# Patient Record
Sex: MALE | Employment: UNEMPLOYED | URBAN - METROPOLITAN AREA
[De-identification: names, ages, dates, MRNs, and addresses within clinical notes are randomized per-mention and may not be internally consistent; named-entity substitution may affect disease eponyms.]

---

## 2018-06-22 ENCOUNTER — LAB VISIT (OUTPATIENT)
Dept: LAB | Facility: HOSPITAL | Age: 73
End: 2018-06-22
Attending: FAMILY MEDICINE
Payer: MEDICARE

## 2018-06-22 DIAGNOSIS — R53.83 FATIGUE: Primary | ICD-10-CM

## 2018-06-22 DIAGNOSIS — I63.59: ICD-10-CM

## 2018-06-22 LAB
INR PPP: 1.6
PROTHROMBIN TIME: 17 SEC

## 2018-06-22 PROCEDURE — 36415 COLL VENOUS BLD VENIPUNCTURE: CPT

## 2018-06-22 PROCEDURE — 85610 PROTHROMBIN TIME: CPT

## 2018-06-25 ENCOUNTER — LAB VISIT (OUTPATIENT)
Dept: LAB | Facility: HOSPITAL | Age: 73
End: 2018-06-25
Attending: INTERNAL MEDICINE
Payer: MEDICARE

## 2018-06-25 DIAGNOSIS — I63.59: Primary | ICD-10-CM

## 2018-06-25 DIAGNOSIS — R53.83 FATIGUE: ICD-10-CM

## 2018-06-25 LAB
INR PPP: 1.2
PROTHROMBIN TIME: 12.2 SEC

## 2018-06-25 PROCEDURE — 85610 PROTHROMBIN TIME: CPT

## 2018-06-25 PROCEDURE — 36415 COLL VENOUS BLD VENIPUNCTURE: CPT

## 2019-01-31 PROCEDURE — 96374 THER/PROPH/DIAG INJ IV PUSH: CPT

## 2019-01-31 PROCEDURE — 96376 TX/PRO/DX INJ SAME DRUG ADON: CPT

## 2019-01-31 PROCEDURE — 99285 EMERGENCY DEPT VISIT HI MDM: CPT | Mod: 25

## 2019-02-01 ENCOUNTER — HOSPITAL ENCOUNTER (OUTPATIENT)
Facility: HOSPITAL | Age: 74
Discharge: HOME | End: 2019-02-01
Attending: EMERGENCY MEDICINE | Admitting: HOSPITALIST
Payer: MEDICARE

## 2019-02-01 VITALS
DIASTOLIC BLOOD PRESSURE: 73 MMHG | TEMPERATURE: 98 F | OXYGEN SATURATION: 97 % | SYSTOLIC BLOOD PRESSURE: 128 MMHG | WEIGHT: 140.5 LBS | RESPIRATION RATE: 20 BRPM | HEIGHT: 70 IN | BODY MASS INDEX: 20.11 KG/M2 | HEART RATE: 70 BPM

## 2019-02-01 DIAGNOSIS — R07.9 CHEST PAIN: ICD-10-CM

## 2019-02-01 DIAGNOSIS — R79.1 SUPRATHERAPEUTIC INR: ICD-10-CM

## 2019-02-01 DIAGNOSIS — R79.89 ELEVATED TROPONIN: ICD-10-CM

## 2019-02-01 DIAGNOSIS — I50.9 ACUTE ON CHRONIC CONGESTIVE HEART FAILURE, UNSPECIFIED HEART FAILURE TYPE: Primary | ICD-10-CM

## 2019-02-01 DIAGNOSIS — I50.9 HEART FAILURE: ICD-10-CM

## 2019-02-01 DIAGNOSIS — I50.43 ACUTE ON CHRONIC COMBINED SYSTOLIC AND DIASTOLIC CONGESTIVE HEART FAILURE: ICD-10-CM

## 2019-02-01 DIAGNOSIS — I48.91 ATRIAL FIBRILLATION, UNSPECIFIED TYPE: ICD-10-CM

## 2019-02-01 DIAGNOSIS — I25.10 CAD (CORONARY ARTERY DISEASE): ICD-10-CM

## 2019-02-01 DIAGNOSIS — I25.10 CORONARY ARTERY DISEASE INVOLVING NATIVE CORONARY ARTERY OF NATIVE HEART WITHOUT ANGINA PECTORIS: ICD-10-CM

## 2019-02-01 DIAGNOSIS — E87.5 HYPERKALEMIA: ICD-10-CM

## 2019-02-01 LAB
ALBUMIN SERPL BCP-MCNC: 3.3 G/DL
ALP SERPL-CCNC: 99 U/L
ALT SERPL W/O P-5'-P-CCNC: 27 U/L
ANION GAP SERPL CALC-SCNC: 9 MMOL/L
AORTIC ROOT ANNULUS: 3.09 CM
AORTIC VALVE CUSP SEPERATION: 2.05 CM
APTT BLDCRRT: 51.7 SEC
ASCENDING AORTA: 2.82 CM
AST SERPL-CCNC: 47 U/L
AV INDEX (PROSTH): 0.7
AV MEAN GRADIENT: 4.26 MMHG
AV PEAK GRADIENT: 7.4 MMHG
AV VALVE AREA: 2.85 CM2
AV VELOCITY RATIO: 0.73
BASOPHILS # BLD AUTO: 0.02 K/UL
BASOPHILS NFR BLD: 0.2 %
BILIRUB SERPL-MCNC: 1.3 MG/DL
BNP SERPL-MCNC: 2847 PG/ML
BSA FOR ECHO PROCEDURE: 1.77 M2
BUN SERPL-MCNC: 21 MG/DL
CALCIUM SERPL-MCNC: 8.9 MG/DL
CHLORIDE SERPL-SCNC: 109 MMOL/L
CHOLEST SERPL-MCNC: 141 MG/DL
CHOLEST/HDLC SERPL: 3.7 {RATIO}
CO2 SERPL-SCNC: 21 MMOL/L
CREAT SERPL-MCNC: 1.4 MG/DL
CV ECHO LV RWT: 0.49 CM
CV STRESS BASE HR: 69 BPM
DIASTOLIC BLOOD PRESSURE: 73 MMHG
DIFFERENTIAL METHOD: ABNORMAL
DOP CALC AO PEAK VEL: 1.36 M/S
DOP CALC AO VTI: 25.71 CM
DOP CALC LVOT AREA: 4.05 CM2
DOP CALC LVOT DIAMETER: 2.27 CM
DOP CALC LVOT PEAK VEL: 0.99 M/S
DOP CALC LVOT STROKE VOLUME: 73.17 CM3
DOP CALCLVOT PEAK VEL VTI: 18.09 CM
E WAVE DECELERATION TIME: 142.1 MSEC
E/A RATIO: 3.16
ECHO LV POSTERIOR WALL: 1.38 CM (ref 0.6–1.1)
EOSINOPHIL # BLD AUTO: 0.4 K/UL
EOSINOPHIL NFR BLD: 4.7 %
ERYTHROCYTE [DISTWIDTH] IN BLOOD BY AUTOMATED COUNT: 17.7 %
EST. GFR  (AFRICAN AMERICAN): 58 ML/MIN/1.73 M^2
EST. GFR  (NON AFRICAN AMERICAN): 50 ML/MIN/1.73 M^2
FRACTIONAL SHORTENING: 19 % (ref 28–44)
GLUCOSE SERPL-MCNC: 123 MG/DL
HCT VFR BLD AUTO: 36.7 %
HDLC SERPL-MCNC: 38 MG/DL
HDLC SERPL: 27 %
HGB BLD-MCNC: 11.9 G/DL
INR PPP: 5.2
INTERVENTRICULAR SEPTUM: 1.2 CM (ref 0.6–1.1)
IVRT: 0.15 MSEC
LA MAJOR: 6.73 CM
LA MINOR: 7.09 CM
LA WIDTH: 5.87 CM
LDLC SERPL CALC-MCNC: 83.4 MG/DL
LEFT ATRIUM SIZE: 4.72 CM
LEFT ATRIUM VOLUME INDEX: 90.5 ML/M2
LEFT ATRIUM VOLUME: 162.62 CM3
LEFT INTERNAL DIMENSION IN SYSTOLE: 4.53 CM (ref 2.1–4)
LEFT VENTRICLE DIASTOLIC VOLUME INDEX: 84.93 ML/M2
LEFT VENTRICLE DIASTOLIC VOLUME: 152.58 ML
LEFT VENTRICLE MASS INDEX: 171.5 G/M2
LEFT VENTRICLE SYSTOLIC VOLUME INDEX: 52.3 ML/M2
LEFT VENTRICLE SYSTOLIC VOLUME: 93.99 ML
LEFT VENTRICULAR INTERNAL DIMENSION IN DIASTOLE: 5.58 CM (ref 3.5–6)
LEFT VENTRICULAR MASS: 308.11 G
LYMPHOCYTES # BLD AUTO: 1.5 K/UL
LYMPHOCYTES NFR BLD: 17.7 %
MCH RBC QN AUTO: 28.1 PG
MCHC RBC AUTO-ENTMCNC: 32.4 G/DL
MCV RBC AUTO: 87 FL
MONOCYTES # BLD AUTO: 1.2 K/UL
MONOCYTES NFR BLD: 14.9 %
MV PEAK A VEL: 0.38 M/S
MV PEAK E VEL: 1.2 M/S
NEUTROPHILS # BLD AUTO: 5.1 K/UL
NEUTROPHILS NFR BLD: 62.5 %
NONHDLC SERPL-MCNC: 103 MG/DL
NUC REST EJECTION FRACTION: 17
OHS CV CPX 85 PERCENT MAX PREDICTED HEART RATE MALE: 126
OHS CV CPX MAX PREDICTED HEART RATE: 148
OHS CV CPX PATIENT IS FEMALE: 0
OHS CV CPX PATIENT IS MALE: 1
OHS CV CPX PEAK DIASTOLIC BLOOD PRESSURE: 63 MMHG
OHS CV CPX PEAK HEAR RATE: 71 BPM
OHS CV CPX PEAK RATE PRESSURE PRODUCT: 9727
OHS CV CPX PEAK SYSTOLIC BLOOD PRESSURE: 137 MMHG
OHS CV CPX PERCENT MAX PREDICTED HEART RATE ACHIEVED: 48
OHS CV CPX RATE PRESSURE PRODUCT PRESENTING: NORMAL
PISA TR MAX VEL: 2.9 M/S
PLATELET # BLD AUTO: 205 K/UL
PMV BLD AUTO: 9.6 FL
POTASSIUM SERPL-SCNC: 5.6 MMOL/L
PROT SERPL-MCNC: 6.3 G/DL
PROTHROMBIN TIME: 54.3 SEC
PULM VEIN S/D RATIO: 1.94
PV PEAK D VEL: 0.36 M/S
PV PEAK S VEL: 0.7 M/S
PV PEAK VELOCITY: 0.96 CM/S
RA MAJOR: 6.76 CM
RA PRESSURE: 3 MMHG
RA WIDTH: 5 CM
RBC # BLD AUTO: 4.24 M/UL
RIGHT VENTRICULAR END-DIASTOLIC DIMENSION: 3.22 CM
RV TISSUE DOPPLER FREE WALL SYSTOLIC VELOCITY 1 (APICAL 4 CHAMBER VIEW): 11.32 M/S
SINUS: 3.12 CM
SODIUM SERPL-SCNC: 139 MMOL/L
STJ: 2.56 CM
SYSTOLIC BLOOD PRESSURE: 146 MMHG
TR MAX PG: 33.64 MMHG
TRICUSPID ANNULAR PLANE SYSTOLIC EXCURSION: 1.17 CM
TRIGL SERPL-MCNC: 98 MG/DL
TROPONIN I SERPL DL<=0.01 NG/ML-MCNC: 0.12 NG/ML
TROPONIN I SERPL DL<=0.01 NG/ML-MCNC: 0.13 NG/ML
TSH SERPL DL<=0.005 MIU/L-ACNC: 1.66 UIU/ML
TV REST PULMONARY ARTERY PRESSURE: 37 MMHG
WBC # BLD AUTO: 8.23 K/UL

## 2019-02-01 PROCEDURE — 80053 COMPREHEN METABOLIC PANEL: CPT

## 2019-02-01 PROCEDURE — 83880 ASSAY OF NATRIURETIC PEPTIDE: CPT

## 2019-02-01 PROCEDURE — 85610 PROTHROMBIN TIME: CPT

## 2019-02-01 PROCEDURE — 80061 LIPID PANEL: CPT

## 2019-02-01 PROCEDURE — 99220 PR INITIAL OBSERVATION CARE,LEVL III: ICD-10-PCS | Mod: ,,, | Performed by: INTERNAL MEDICINE

## 2019-02-01 PROCEDURE — 63600175 PHARM REV CODE 636 W HCPCS: Performed by: NURSE PRACTITIONER

## 2019-02-01 PROCEDURE — 36415 COLL VENOUS BLD VENIPUNCTURE: CPT

## 2019-02-01 PROCEDURE — 84484 ASSAY OF TROPONIN QUANT: CPT | Mod: 91

## 2019-02-01 PROCEDURE — 96376 TX/PRO/DX INJ SAME DRUG ADON: CPT | Mod: 59

## 2019-02-01 PROCEDURE — 96375 TX/PRO/DX INJ NEW DRUG ADDON: CPT | Mod: 59

## 2019-02-01 PROCEDURE — 96372 THER/PROPH/DIAG INJ SC/IM: CPT | Mod: 59

## 2019-02-01 PROCEDURE — 93010 EKG 12-LEAD: ICD-10-PCS | Mod: ,,, | Performed by: INTERNAL MEDICINE

## 2019-02-01 PROCEDURE — 63600175 PHARM REV CODE 636 W HCPCS: Performed by: EMERGENCY MEDICINE

## 2019-02-01 PROCEDURE — G0378 HOSPITAL OBSERVATION PER HR: HCPCS

## 2019-02-01 PROCEDURE — 99220 PR INITIAL OBSERVATION CARE,LEVL III: CPT | Mod: ,,, | Performed by: INTERNAL MEDICINE

## 2019-02-01 PROCEDURE — 93010 ELECTROCARDIOGRAM REPORT: CPT | Mod: ,,, | Performed by: INTERNAL MEDICINE

## 2019-02-01 PROCEDURE — 84443 ASSAY THYROID STIM HORMONE: CPT

## 2019-02-01 PROCEDURE — 25000003 PHARM REV CODE 250: Performed by: EMERGENCY MEDICINE

## 2019-02-01 PROCEDURE — 85730 THROMBOPLASTIN TIME PARTIAL: CPT

## 2019-02-01 PROCEDURE — 94761 N-INVAS EAR/PLS OXIMETRY MLT: CPT

## 2019-02-01 PROCEDURE — 93005 ELECTROCARDIOGRAM TRACING: CPT | Mod: 59

## 2019-02-01 PROCEDURE — 25000003 PHARM REV CODE 250: Performed by: NURSE PRACTITIONER

## 2019-02-01 PROCEDURE — 85025 COMPLETE CBC W/AUTO DIFF WBC: CPT

## 2019-02-01 RX ORDER — FAMOTIDINE 20 MG/1
20 TABLET, FILM COATED ORAL DAILY
Status: DISCONTINUED | OUTPATIENT
Start: 2019-02-01 | End: 2019-02-01 | Stop reason: HOSPADM

## 2019-02-01 RX ORDER — ACETAMINOPHEN 325 MG/1
650 TABLET ORAL EVERY 8 HOURS PRN
Status: DISCONTINUED | OUTPATIENT
Start: 2019-02-01 | End: 2019-02-01 | Stop reason: HOSPADM

## 2019-02-01 RX ORDER — ENOXAPARIN SODIUM 100 MG/ML
40 INJECTION SUBCUTANEOUS EVERY 24 HOURS
Status: DISCONTINUED | OUTPATIENT
Start: 2019-02-01 | End: 2019-02-01 | Stop reason: HOSPADM

## 2019-02-01 RX ORDER — IBUPROFEN 400 MG/1
800 TABLET ORAL EVERY 8 HOURS PRN
Status: DISCONTINUED | OUTPATIENT
Start: 2019-02-01 | End: 2019-02-01 | Stop reason: HOSPADM

## 2019-02-01 RX ORDER — REGADENOSON 0.08 MG/ML
INJECTION, SOLUTION INTRAVENOUS
Status: COMPLETED
Start: 2019-02-01 | End: 2019-02-01

## 2019-02-01 RX ORDER — WARFARIN 4 MG/1
4 TABLET ORAL DAILY
Qty: 30 TABLET | Refills: 0 | Status: SHIPPED | OUTPATIENT
Start: 2019-02-01

## 2019-02-01 RX ORDER — SODIUM CHLORIDE 0.9 % (FLUSH) 0.9 %
5 SYRINGE (ML) INJECTION
Status: DISCONTINUED | OUTPATIENT
Start: 2019-02-01 | End: 2019-02-01 | Stop reason: HOSPADM

## 2019-02-01 RX ORDER — HYDRALAZINE HYDROCHLORIDE 25 MG/1
50 TABLET, FILM COATED ORAL 2 TIMES DAILY
Status: DISCONTINUED | OUTPATIENT
Start: 2019-02-01 | End: 2019-02-01 | Stop reason: HOSPADM

## 2019-02-01 RX ORDER — FUROSEMIDE 10 MG/ML
40 INJECTION INTRAMUSCULAR; INTRAVENOUS DAILY
Status: DISCONTINUED | OUTPATIENT
Start: 2019-02-01 | End: 2019-02-01 | Stop reason: HOSPADM

## 2019-02-01 RX ORDER — ALPRAZOLAM 0.25 MG/1
0.25 TABLET ORAL
Status: DISCONTINUED | OUTPATIENT
Start: 2019-02-01 | End: 2019-02-01 | Stop reason: HOSPADM

## 2019-02-01 RX ORDER — FAMOTIDINE 20 MG/1
20 TABLET, FILM COATED ORAL 2 TIMES DAILY
Status: DISCONTINUED | OUTPATIENT
Start: 2019-02-01 | End: 2019-02-01

## 2019-02-01 RX ORDER — ASPIRIN 325 MG
325 TABLET, DELAYED RELEASE (ENTERIC COATED) ORAL
Status: COMPLETED | OUTPATIENT
Start: 2019-02-01 | End: 2019-02-01

## 2019-02-01 RX ORDER — AMIODARONE HYDROCHLORIDE 200 MG/1
200 TABLET ORAL 2 TIMES DAILY
Status: DISCONTINUED | OUTPATIENT
Start: 2019-02-01 | End: 2019-02-01 | Stop reason: HOSPADM

## 2019-02-01 RX ORDER — HYDROCODONE BITARTRATE AND ACETAMINOPHEN 5; 325 MG/1; MG/1
1 TABLET ORAL EVERY 6 HOURS PRN
Status: DISCONTINUED | OUTPATIENT
Start: 2019-02-01 | End: 2019-02-01 | Stop reason: HOSPADM

## 2019-02-01 RX ORDER — FUROSEMIDE 10 MG/ML
40 INJECTION INTRAMUSCULAR; INTRAVENOUS
Status: COMPLETED | OUTPATIENT
Start: 2019-02-01 | End: 2019-02-01

## 2019-02-01 RX ORDER — FUROSEMIDE 10 MG/ML
40 INJECTION INTRAMUSCULAR; INTRAVENOUS 2 TIMES DAILY
Status: DISCONTINUED | OUTPATIENT
Start: 2019-02-01 | End: 2019-02-01

## 2019-02-01 RX ADMIN — IBUPROFEN 800 MG: 400 TABLET, FILM COATED ORAL at 05:02

## 2019-02-01 RX ADMIN — PROMETHAZINE HYDROCHLORIDE 6.25 MG: 25 INJECTION INTRAMUSCULAR; INTRAVENOUS at 09:02

## 2019-02-01 RX ADMIN — ALPRAZOLAM 0.25 MG: 0.25 TABLET ORAL at 09:02

## 2019-02-01 RX ADMIN — HYDRALAZINE HYDROCHLORIDE 50 MG: 25 TABLET ORAL at 09:02

## 2019-02-01 RX ADMIN — FAMOTIDINE 20 MG: 20 TABLET ORAL at 09:02

## 2019-02-01 RX ADMIN — FUROSEMIDE 40 MG: 10 INJECTION, SOLUTION INTRAVENOUS at 09:02

## 2019-02-01 RX ADMIN — ENOXAPARIN SODIUM 40 MG: 100 INJECTION SUBCUTANEOUS at 05:02

## 2019-02-01 RX ADMIN — ASPIRIN 325 MG: 325 TABLET, DELAYED RELEASE ORAL at 02:02

## 2019-02-01 RX ADMIN — AMIODARONE HYDROCHLORIDE 200 MG: 200 TABLET ORAL at 09:02

## 2019-02-01 RX ADMIN — FUROSEMIDE 40 MG: 10 INJECTION, SOLUTION INTRAVENOUS at 02:02

## 2019-02-01 RX ADMIN — HYDROCODONE BITARTRATE AND ACETAMINOPHEN 1 TABLET: 5; 325 TABLET ORAL at 09:02

## 2019-02-01 RX ADMIN — FUROSEMIDE 40 MG: 10 INJECTION, SOLUTION INTRAVENOUS at 04:02

## 2019-02-01 NOTE — HOSPITAL COURSE
Seen and evaluated by Cardiology who obtained a 2D echo and nuclear stress test.  2D echo showed sore late depressed LVEF function 20% atrial septal and apical akinesis plus left ventricle enlargement and grade 3 diastolic dysfunction.  Cardiology noted that if the nuclear stress test was basically unchanged and correlated with 2D echo and patient's last known function he will be okay to discharge. He has been diuresed with IV Lasix b.i.d. since admission and tells me he feels better.  He has been ambulating about the room and in the hallway without difficulty no evidence of shortness of breath or fluid volume overload.  Nuclear stress test showed  there is a severe, infarct defect(s) in the apical and anteroseptal wall(s),  consistent with known disease. Patient cleared by Cardiology for discharge with close follow-up  in clinic.      Patient noted to have supratherapeutic INR equals 5.2.  He is instructed to hold his Coumadin dose x2 day 02-20-19 and 02/03/2019.  Resume his usual dose on Monday and reports his Coumadin Clinic.  Patient sees cardiology at [facility] he has follow-up appointment with primary care Dr. Edwards arranged by case management.  He is super anxious for discharge, his vitals were grossly normal blood pressure 124/62 pulse 69 respirations 16 sat 96% on room air.  Discharged in stable condition

## 2019-02-01 NOTE — CONSULTS
Consulted for Diet education concerning CHF. Attempted to meet with pt; pt off unit. Provided handouts and f/u resources to bedside. RD will attempt f/u education.

## 2019-02-01 NOTE — ED PROVIDER NOTES
Encounter Date: 2019       History     Chief Complaint   Patient presents with    Chest Pain     for several years, reports reproducable tenderness, denies any sob or other complaints.      71-year-old male with history of CHF, CAD, AFib, hypertension present with chest pain shortness of breath just prior to arrival.  Chest pain is in left chest, nonradiating that has now resolved at ED.   Patient has been having worsening of shortness of breath for the past 1 week.  Shortness of breath worsening with walking for 3-5 steps. Patient was seen by his cardiologist, Dr. Nicole last week, and had his medication changed (unknown) without improvement. Patient has been taking all his medications without abruption. Patient denies fever chills nausea vomiting.            Review of patient's allergies indicates:  No Known Allergies  Past Medical History:   Diagnosis Date    Anticoagulant long-term use     Arthritis     Atrial fibrillation     Atrial flutter     Cardiomyopathy     Carotid artery occlusion     CHF (congestive heart failure)     Coronary artery disease     Hypertension     Stroke      Past Surgical History:   Procedure Laterality Date    CARDIAC SURGERY      EXTRACTION-LEAD N/A 10/28/2015    Performed by Martin Alva [facility] at [facility] CATH LAB    EYE SURGERY      TONSILLECTOMY       History reviewed. No pertinent family history.  Social History     Tobacco Use    Smoking status: Former Smoker     Last attempt to quit: 1997     Years since quittin.3   Substance Use Topics    Alcohol use: No     Alcohol/week: 0.0 oz    Drug use: No     Review of Systems   Constitutional: Negative for fever.   HENT: Negative for sore throat.    Respiratory: Positive for shortness of breath.    Cardiovascular: Positive for chest pain.   Gastrointestinal: Negative for nausea.   Genitourinary: Negative for dysuria.   Musculoskeletal: Negative for back pain.   Skin: Negative for rash.   Neurological:  Negative for weakness.   Hematological: Does not bruise/bleed easily.       Physical Exam     Initial Vitals [02/01/19 0011]   BP Pulse Resp Temp SpO2   126/72 68 18 98 °F (36.7 °C) 100 %      MAP       --         Physical Exam    Constitutional: He appears well-developed and well-nourished. He is not diaphoretic. No distress.   HENT:   Head: Normocephalic and atraumatic.   Eyes: EOM are normal. Pupils are equal, round, and reactive to light.   Neck: Normal range of motion. Neck supple.   Cardiovascular: Normal rate and regular rhythm.   Pulmonary/Chest: No stridor. No respiratory distress. He has no wheezes. He has rales. He exhibits no tenderness.   Abdominal: Soft. Bowel sounds are normal. He exhibits no distension. There is no tenderness. There is no rebound.   Musculoskeletal: Normal range of motion. He exhibits no edema or tenderness.   Neurological: He is alert and oriented to person, place, and time. He has normal strength.   Skin: Skin is warm and dry. No rash noted.   Psychiatric: He has a normal mood and affect. His behavior is normal.         ED Course   Procedures  Labs Reviewed   COMPREHENSIVE METABOLIC PANEL - Abnormal; Notable for the following components:       Result Value    Potassium 5.6 (*)     CO2 21 (*)     Glucose 123 (*)     Albumin 3.3 (*)     Total Bilirubin 1.3 (*)     AST 47 (*)     eGFR if  58 (*)     eGFR if non  50 (*)     All other components within normal limits   B-TYPE NATRIURETIC PEPTIDE - Abnormal; Notable for the following components:    BNP 2,847 (*)     All other components within normal limits   CBC W/ AUTO DIFFERENTIAL - Abnormal; Notable for the following components:    RBC 4.24 (*)     Hemoglobin 11.9 (*)     Hematocrit 36.7 (*)     RDW 17.7 (*)     Mono # 1.2 (*)     Lymph% 17.7 (*)     All other components within normal limits   TROPONIN I - Abnormal; Notable for the following components:    Troponin I 0.133 (*)     All other components  within normal limits   PROTIME-INR - Abnormal; Notable for the following components:    Prothrombin Time 54.3 (*)     INR 5.2 (*)     All other components within normal limits    Narrative:     INR  critical result(s) called and verbal readback obtained from   WILLY TIDWELL, 02/01/2019 02:46   APTT - Abnormal; Notable for the following components:    aPTT 51.7 (*)     All other components within normal limits   TROPONIN I     EKG Readings: (Independently Interpreted)   Initial Reading: No STEMI.   Paced rhythm, no stemi       Imaging Results          X-Ray Chest AP Portable (Final result)  Result time 02/01/19 01:55:24    Final result by Kvng Galdamez MD (02/01/19 01:55:24)                 Impression:      Cardiomegaly with mildly accentuated bilateral interstitial markings which could relate to mild interstitial edema.  Clinical correlation is advised.      Electronically signed by: Kvng Galdamez MD  Date:    02/01/2019  Time:    01:55             Narrative:    EXAMINATION:  XR CHEST AP PORTABLE    CLINICAL HISTORY:  chest pain;    TECHNIQUE:  Single frontal view of the chest was performed.    COMPARISON:  10/28/2015    FINDINGS:  There is a right chest wall cardiac pacing device present.  There are postoperative changes of prior median sternotomy.  The cardiomediastinal silhouette is enlarged.  The lungs are symmetrically expanded with mildly accentuated bilateral interstitial markings.  No evidence of large confluent airspace consolidation or pleural effusion.  No evidence of pneumothorax.  Visualized osseous structures appear intact.                                 Medical Decision Making:   Clinical Tests:   Lab Tests: Ordered and Reviewed  Radiological Study: Ordered and Reviewed  Medical Tests: Ordered and Reviewed  ED Management:  71-year-old male with history CHF with Severe ischemic cardiomyopathy last EF 10-15% September 2018, present with worsening of shortness of breath for the past few weeks.   Patient was seen by his cardiologist Dr. Nicole last week and had his lasix adjusted without significant improvement. His BNP on 1/28 was 615, today is 2847. No significant improvement of SOB after IV lasix given.  Recent INR also elevated at 5.2 without evidence of acute bleeding. Will hold coumadin today.  Elevated troponin @ 0.133.  Will admit for obs.    Other:   I have discussed this case with another health care provider.                      Clinical Impression:   The primary encounter diagnosis was Acute on chronic congestive heart failure, unspecified heart failure type. Diagnoses of Chest pain, Supratherapeutic INR, Elevated troponin, and Hyperkalemia were also pertinent to this visit.                             Gume Nuñez MD  02/01/19 042

## 2019-02-01 NOTE — NURSING
Discharge orders received.  IV discontinued without complaint, catheter intact.  Telemetry monitoring discontinued.    Patient AAO x 4.  Currently denies CP, nausea, or SOB at rest on RA.  Chronic GONZALEZ.  VS stable.   Respirations even, unlabored.  No distress noted.     No falls or harm noted during stay.

## 2019-02-01 NOTE — PROGRESS NOTES
Follow up appointment scheduled with PCP.  Spoke with Tamika.  All information (Date, time, location and contact info) placed in AVS and on DC sheet.  Information given and explained to pt. Pt also instructed to call 24-48 hours prior to scheduled time if rescheduling needed.  Patient instructed to bring all meds currently taking in their original bottles along with insurance card and picture ID to all appointments.

## 2019-02-01 NOTE — H&P
[facility] Medicine  History & Physical    Patient Name: Martin Gutierrez  MRN: [id number]  Admission Date: 2/1/2019  Attending Physician: Ruthie Acosta MD   Primary Care Provider: Haja Edwards MD         Patient information was obtained from patient and ER records.     Subjective:     Principal Problem:Acute on chronic congestive heart failure    Chief Complaint:   Chief Complaint   Patient presents with    Chest Pain     for several years, reports reproducable tenderness, denies any sob or other complaints.         HPI: Martin Gutierrez is a 73 y.o. male patient with a history of long-term anticoagulation use, atrial fib, cardiomyopathy, heart failure, CAD, hypertension, stroke, and panic attacks.  Patient presented for evaluation of acute onset left-sided chest pain with associated shortness of breath, chest pain was nonradiating, however he reports activity intolerance orthopnea where as he could not lay down flat and he could not walk any more than about 4 or 5 steps without getting extremely short of breath.  Patient reports that the [facility] which is his cardiologist and he saw him last week.  Patient reports a recent change in his medication regimen with no improvement.    Initial workup reveals supratherapeutic INR at 5.2, hyperkalemia at 5.6, elevated troponin 1 level peaked at 0.133, and BNP of 2,847. Chest x-ray x-ray revealed cardiomegaly with mild interstitial edema, patient's last 2D echo was in 2015 he had an LVEF of 40% with diastolic dysfunction    Past Medical History:   Diagnosis Date    Anticoagulant long-term use     Anxiety     Arthritis     Atrial fibrillation     Atrial flutter     Cardiomyopathy     Carotid artery occlusion     CHF (congestive heart failure)     Coronary artery disease     Debility     Hypertension     Insomnia     Panic attacks     SOB (shortness of breath) on exertion     Stroke        Past Surgical History:   Procedure  Laterality Date    CARDIAC SURGERY      EXTRACTION-LEAD N/A 10/28/2015    Performed by [facility] MD at [facility] LAB    EYE SURGERY      TONSILLECTOMY         Review of patient's allergies indicates:  No Known Allergies    No current facility-administered medications on file prior to encounter.      Current Outpatient Medications on File Prior to Encounter   Medication Sig    hydrocodone-acetaminophen 5-325mg (NORCO) 5-325 mg per tablet Take 1 tablet by mouth every 6 (six) hours as needed for Pain.    alprazolam (XANAX) 0.25 MG tablet Take 0.25 mg by mouth as needed for Anxiety.    amiodarone (PACERONE) 200 MG Tab Take 200 mg by mouth 2 (two) times daily.    atorvastatin (LIPITOR) 40 MG tablet Take 40 mg by mouth once daily.    doxycycline (VIBRA-TABS) 100 MG tablet Take 1 tablet (100 mg total) by mouth 2 (two) times daily.    fenofibric acid (TRILIPIX) 135 mg CpDR Take by mouth once daily.    ferrous sulfate 325 (65 FE) MG EC tablet Take 1 tablet (325 mg total) by mouth 2 (two) times daily.    folic acid (FOLVITE) 1 MG tablet Take 1 tablet (1 mg total) by mouth once daily.    furosemide (LASIX) 40 MG tablet Take 40 mg by mouth 2 (two) times daily.    hydrALAZINE (APRESOLINE) 50 MG tablet Take 50 mg by mouth 2 (two) times daily.     spironolactone (ALDACTONE) 25 MG tablet Take 25 mg by mouth 3 (three) times a week.     warfarin (COUMADIN) 4 MG tablet Take 4 mg by mouth once daily.     Family History     None        Tobacco Use    Smoking status: Former Smoker     Packs/day: 0.25     Years: 33.00     Pack years: 8.25     Last attempt to quit: 1997     Years since quittin.3    Smokeless tobacco: Never Used   Substance and Sexual Activity    Alcohol use: No     Alcohol/week: 0.0 oz    Drug use: No    Sexual activity: Not on file     Review of Systems   Constitutional: Negative for appetite change, chills, diaphoresis and fever.   HENT: Negative for congestion, hearing loss, sore  throat, tinnitus and trouble swallowing.    Eyes: Negative for photophobia, discharge, itching and visual disturbance.   Respiratory: Positive for shortness of breath. Negative for apnea, cough, wheezing and stridor.    Cardiovascular: Negative for chest pain, palpitations and leg swelling.   Gastrointestinal: Negative for abdominal distention, abdominal pain, blood in stool, constipation, diarrhea and nausea.   Endocrine: Negative for polydipsia, polyphagia and polyuria.   Genitourinary: Negative for difficulty urinating, dysuria, flank pain and frequency.   Musculoskeletal: Negative for arthralgias, joint swelling and neck stiffness.   Skin: Negative for color change, rash and wound.   Neurological: Negative for dizziness, tremors, seizures, light-headedness, numbness and headaches.   Hematological: Negative for adenopathy.   Psychiatric/Behavioral: Negative for hallucinations and self-injury.     Objective:     Vital Signs (Most Recent):  Temp: 97.7 °F (36.5 °C) (02/01/19 0804)  Pulse: 68 (02/01/19 0804)  Resp: 18 (02/01/19 0804)  BP: (!) 152/81 (02/01/19 0804)  SpO2: 98 % (02/01/19 0804) Vital Signs (24h Range):  Temp:  [97.7 °F (36.5 °C)-98 °F (36.7 °C)] 97.7 °F (36.5 °C)  Pulse:  [68-70] 68  Resp:  [14-19] 18  SpO2:  [93 %-100 %] 98 %  BP: (126-152)/(72-88) 152/81     Weight: 63.7 kg (140 lb 8 oz)  Body mass index is 20.16 kg/m².    Physical Exam   Constitutional: He is oriented to person, place, and time. He appears well-developed and well-nourished. He is cooperative.   HENT:   Head: Normocephalic and atraumatic.   Eyes: Conjunctivae and lids are normal. Pupils are equal, round, and reactive to light.   Neck: Normal range of motion and full passive range of motion without pain. Neck supple. No JVD present. No edema present. No thyroid mass present.   Cardiovascular: Normal rate, S1 normal, S2 normal and intact distal pulses.   No murmur heard.  Pulmonary/Chest: Effort normal.   Abdominal: Soft. Bowel sounds  are normal. He exhibits no distension and no abdominal bruit. There is no splenomegaly or hepatomegaly. There is no tenderness. There is no CVA tenderness.   Musculoskeletal: Normal range of motion.   Lymphadenopathy:     He has no cervical adenopathy.     He has no axillary adenopathy.   Neurological: He is alert and oriented to person, place, and time. He has normal reflexes. He displays no tremor. He displays no seizure activity.   Skin: Skin is warm, dry and intact.   Psychiatric: He has a normal mood and affect. His speech is normal. Thought content normal. Cognition and memory are normal.         CRANIAL NERVES     CN III, IV, VI   Pupils are equal, round, and reactive to light.       Significant Labs:   CBC:   Recent Labs   Lab 02/01/19 0143   WBC 8.23   HGB 11.9*   HCT 36.7*        CMP:   Recent Labs   Lab 02/01/19 0143      K 5.6*      CO2 21*   *   BUN 21   CREATININE 1.4   CALCIUM 8.9   PROT 6.3   ALBUMIN 3.3*   BILITOT 1.3*   ALKPHOS 99   AST 47*   ALT 27   ANIONGAP 9   EGFRNONAA 50*     Cardiac Markers:   Recent Labs   Lab 02/01/19 0143   BNP 2,847*     Coagulation:   Recent Labs   Lab 02/01/19 0143   INR 5.2*   APTT 51.7*     Lactic Acid: No results for input(s): LACTATE in the last 48 hours.  Lipase: No results for input(s): LIPASE in the last 48 hours.  Lipid Panel: No results for input(s): CHOL, HDL, LDLCALC, TRIG, CHOLHDL in the last 48 hours.  Magnesium: No results for input(s): MG in the last 48 hours.  Troponin:   Recent Labs   Lab 02/01/19  0143 02/01/19  0407 02/01/19  0807   TROPONINI 0.133* 0.125* 0.116*     Significant Imaging:   Imaging Results          X-Ray Chest AP Portable (Final result)  Result time 02/01/19 01:55:24    Final result by Kvng Galdamez MD (02/01/19 01:55:24)                 Impression:      Cardiomegaly with mildly accentuated bilateral interstitial markings which could relate to mild interstitial edema.  Clinical correlation is  advised.      Electronically signed by: Kvng Galdamez MD  Date:    02/01/2019  Time:    01:55             Narrative:    EXAMINATION:  XR CHEST AP PORTABLE    CLINICAL HISTORY:  chest pain;    TECHNIQUE:  Single frontal view of the chest was performed.    COMPARISON:  10/28/2015    FINDINGS:  There is a right chest wall cardiac pacing device present.  There are postoperative changes of prior median sternotomy.  The cardiomediastinal silhouette is enlarged.  The lungs are symmetrically expanded with mildly accentuated bilateral interstitial markings.  No evidence of large confluent airspace consolidation or pleural effusion.  No evidence of pneumothorax.  Visualized osseous structures appear intact.                                  Assessment/Plan:     * Acute on chronic congestive heart failure    Patient with a history of heart failure with LVEF 40% plus diastolic dysfunction he was started on IV Lasix at the time of presentation, with some improvement in his condition due to his risk factors, and elevated troponins, cardiology will be consulted, no fever and no infectious process noted no white count, creatinine is within normal limits.  Continuous cardiac monitoring  Continue to trend CE Q8 hrs X3 sets  ASA 81 mg PO Daily  Continue diuresing with IV Lasix  Continue home spironolactone 25 mg  TSH and lipid panel for completeness       Atrial fibrillation    Chronic paroxysmal AFib with a flutter on warfarin INR 5.2 will hold today's dosage and monitor, patient denies any overt signs of bleeding including melena hematochezia hemoptysis or hematemesis.  -as below hold Coumadin today as INR 5.2  -continue amiodarone 200 mg daily     Coronary artery disease, PCI to LAD in 1994 and CABG in 1997, PCI LCx 1997    Cardiology following as above  Continue fenofibrate, atorvastatin         VTE Risk Mitigation (From admission, onward)        Ordered     enoxaparin injection 40 mg  Daily      02/01/19 0755          Letitia BERMUDEZ  VERN Bates, FNP-C  Hospitalist - Department of Hospital Medicine  [address] [address]  Office [phone]; Pager [phone]

## 2019-02-01 NOTE — PLAN OF CARE
02/01/19 1133   Discharge Assessment   Assessment Type Discharge Planning Assessment   TN to patient's room to complete DC needs assessment.  Patient off unit.

## 2019-02-01 NOTE — CONSULTS
[facility]  Cardiology  Consult Note    Patient Name: Martin Gutierrez  MRN: [id number]  Admission Date: 2/1/2019  Hospital Length of Stay: 0 days  Code Status: Full Code   Attending Provider: Ruthie Acosta MD   Consulting Provider: Erwin Obando MD  Primary Care Physician: Haja Edwards MD  Principal Problem:Acute on chronic congestive heart failure    Patient information was obtained from patient and ER records.     Consults  Subjective:     Chief Complaint:  CP     HPI:   71-year-old male with history of CHF, CAD, AFib, hypertension present with chest pain shortness of breath just prior to arrival.  Chest pain is in left chest, nonradiating that has now resolved at ED.   Patient has been having worsening of shortness of breath for the past 1 week.  Shortness of breath worsening with walking for 3-5 steps. Patient was seen by his cardiologist, Dr. Nicole last week, and had his medication changed (unknown) without improvement. Patient has been taking all his medications without abruption. Patient denies fever chills nausea vomiting.      Followed by Heart Clinic and [place] [facility]  Troponin 0.1 flat pattern  Currently CP free  EKG V-paced    Last saw EP 2015  paroxysmal AF, chronic systolic ischemic CMP (EF recently 40%) s/p CABG and PCI to LAD/LCx in 1990s, HTN, HLD, and ICD implant in 2006 with inappropriate shocks for AF with RVR presents for lead extraction evaluation.     ICD was at [facility] last year, had generator change to PPM 8/2014.   Patient did not want an ICD replaced as he had PTSD from inappropriate shocks.  PPM placed instead.   Then in May of 2015 developed a superficial blister over pocket.  Underwent removal of generator, leads capped, cultures reportedly negative and he was treated with clindamycin for 14 days.   Originally improved.  However for 3-4 weeks it began to blister again. He was following with ID at [facility], Dr. Sales, and was seeing Dr. Jarrett. He  completed 14 days of clindamycin which did not help. He has since been treating it topically with mupirocin ointment.   After placed on doxycycline which he just finished.  No fever or chills, cultures negative.   He does have pain at the site.     ECHO showed LVEF 40%  CXR showed single-coil ICD lead in the RVOT    Past Medical History:   Diagnosis Date    Anticoagulant long-term use     Anxiety     Arthritis     Atrial fibrillation     Atrial flutter     Cardiomyopathy     Carotid artery occlusion     CHF (congestive heart failure)     Coronary artery disease     Debility     Hypertension     Insomnia     Panic attacks     SOB (shortness of breath) on exertion     Stroke        Past Surgical History:   Procedure Laterality Date    CARDIAC SURGERY      EXTRACTION-LEAD N/A 10/28/2015    Performed by [facility] at [facility] CATH LAB    EYE SURGERY      TONSILLECTOMY         Review of patient's allergies indicates:  No Known Allergies    No current facility-administered medications on file prior to encounter.      Current Outpatient Medications on File Prior to Encounter   Medication Sig    hydrocodone-acetaminophen 5-325mg (NORCO) 5-325 mg per tablet Take 1 tablet by mouth every 6 (six) hours as needed for Pain.    alprazolam (XANAX) 0.25 MG tablet Take 0.25 mg by mouth as needed for Anxiety.    amiodarone (PACERONE) 200 MG Tab Take 200 mg by mouth 2 (two) times daily.    atorvastatin (LIPITOR) 40 MG tablet Take 40 mg by mouth once daily.    doxycycline (VIBRA-TABS) 100 MG tablet Take 1 tablet (100 mg total) by mouth 2 (two) times daily.    fenofibric acid (TRILIPIX) 135 mg CpDR Take by mouth once daily.    ferrous sulfate 325 (65 FE) MG EC tablet Take 1 tablet (325 mg total) by mouth 2 (two) times daily.    folic acid (FOLVITE) 1 MG tablet Take 1 tablet (1 mg total) by mouth once daily.    furosemide (LASIX) 40 MG tablet Take 40 mg by mouth 2 (two) times daily.    hydrALAZINE  (APRESOLINE) 50 MG tablet Take 50 mg by mouth 2 (two) times daily.     spironolactone (ALDACTONE) 25 MG tablet Take 25 mg by mouth 3 (three) times a week.     warfarin (COUMADIN) 4 MG tablet Take 4 mg by mouth once daily.     Family History     None        Tobacco Use    Smoking status: Former Smoker     Packs/day: 0.25     Years: 33.00     Pack years: 8.25     Last attempt to quit: 1997     Years since quittin.3    Smokeless tobacco: Never Used   Substance and Sexual Activity    Alcohol use: No     Alcohol/week: 0.0 oz    Drug use: No    Sexual activity: Not on file     Review of Systems   Constitution: Negative for decreased appetite.   HENT: Negative for ear discharge.    Eyes: Negative for blurred vision.   Endocrine: Negative for polyphagia.   Skin: Negative for nail changes.   Neurological: Negative for aphonia.   Psychiatric/Behavioral: Negative for hallucinations.     Objective:     Vital Signs (Most Recent):  Temp: 97.7 °F (36.5 °C) (19)  Pulse: 68 (19)  Resp: 18 (19)  BP: (!) 152/81 (19)  SpO2: 98 % (19) Vital Signs (24h Range):  Temp:  [97.7 °F (36.5 °C)-98 °F (36.7 °C)] 97.7 °F (36.5 °C)  Pulse:  [68-70] 68  Resp:  [14-19] 18  SpO2:  [93 %-100 %] 98 %  BP: (126-152)/(72-88) 152/81     Weight: 63.7 kg (140 lb 8 oz)  Body mass index is 20.16 kg/m².    SpO2: 98 %  O2 Device (Oxygen Therapy): room air      Intake/Output Summary (Last 24 hours) at 2019 1106  Last data filed at 2019 0614  Gross per 24 hour   Intake --   Output 2650 ml   Net -2650 ml       Lines/Drains/Airways     Drain                 Urethral Catheter 10/28/15 0836 1192 days          Peripheral Intravenous Line                 Peripheral IV - Single Lumen 19 0012 Left Hand less than 1 day                Physical Exam   Constitutional: He is oriented to person, place, and time. He appears well-developed and well-nourished.   HENT:   Head: Normocephalic and  atraumatic.   Eyes: Conjunctivae are normal. Pupils are equal, round, and reactive to light.   Neck: Normal range of motion. Neck supple.   Cardiovascular: Normal rate, normal heart sounds and intact distal pulses.   Pulmonary/Chest: Effort normal and breath sounds normal.   Abdominal: Soft. Bowel sounds are normal.   Musculoskeletal: Normal range of motion.   Neurological: He is alert and oriented to person, place, and time.   Skin: Skin is warm and dry.       Significant Labs: All pertinent lab results from the last 24 hours have been reviewed.    Significant Imaging: Echocardiogram:   2D echo with color flow doppler:   Results for orders placed or performed in visit on 09/21/15   2D Echo w/ Color Flow Doppler   Result Value Ref Range    QEF 40 (A) 55 - 65    Diastolic Dysfunction Yes (A)     Est. PA Systolic Pressure 37     Tricuspid Valve Regurgitation MILD      Assessment and Plan:     * Acute on chronic congestive heart failure    Chronic ischemic CM. Diuresis and afterload reduction as tolerated     S/P abdominal aortic aneurysm repair 1997          Coronary artery disease, PCI to LAD in 1994 and CABG in 1997, PCI LCx 1997    Mildly elevated troponin in a flat pattern. Echo and stress test today - ok for d/c if negative for significant ischemia     Carotid artery disease          Atrial fibrillation    Rate controlled on coumadin         VTE Risk Mitigation (From admission, onward)        Ordered     enoxaparin injection 40 mg  Daily      02/01/19 4884          Thank you for your consult. I will follow-up with patient. Please contact us if you have any additional questions.    Erwin Obando MD  Cardiology   [facility]

## 2019-02-01 NOTE — DISCHARGE SUMMARY
[facility] Hospital Medicine  Discharge Summary      Patient Name: Martin Gutierrez  MRN: [id number]  Admission Date: 2/1/2019  Hospital Length of Stay: 0 days  Discharge Date and Time:  02/01/2019 5:56 PM  Attending Physician: Ruthie Acosta MD   Discharging Provider: MINI Estrada  Primary Care Provider: Haja Edwards MD      HPI:   Martin Gutierrez is a 73 y.o. male patient with a history of long-term anticoagulation use, atrial fib, cardiomyopathy, heart failure, CAD, hypertension, stroke, and panic attacks.  Patient presented for evaluation of acute onset left-sided chest pain with associated shortness of breath, chest pain was nonradiating, however he reports activity intolerance orthopnea where as he could not lay down flat and he could not walk any more than about 4 or 5 steps without getting extremely short of breath.  Patient reports that the costal which is his cardiologist and he saw him last week.  Patient reports a recent change in his medication regimen with no improvement.    Initial workup reveals supratherapeutic INR at 5.2, hyperkalemia at 5.6, elevated troponin 1 level peaked at 0.133, and BNP of 2,847. Chest x-ray x-ray revealed cardiomegaly with mild interstitial edema, patient's last 2D echo was in 2015 he had an LVEF of 40% with diastolic dysfunction    * No surgery found *      Hospital Course:   Seen and evaluated by Cardiology who obtained a 2D echo and nuclear stress test.  2D echo showed sore late depressed LVEF function 20% atrial septal and apical akinesis plus left ventricle enlargement and grade 3 diastolic dysfunction.  Cardiology noted that if the nuclear stress test was basically unchanged and correlated with 2D echo and patient's last known function he will be okay to discharge. He has been diuresed with IV Lasix b.i.d. since admission and tells me he feels better.  He has been ambulating about the room and in the hallway without difficulty no  evidence of shortness of breath or fluid volume overload.  Nuclear stress test showed  there is a severe, infarct defect(s) in the apical and anteroseptal wall(s),  consistent with known disease. Patient cleared by Cardiology for discharge with close follow-up  in clinic.      Patient noted to have supratherapeutic INR equals 5.2.  He is instructed to hold his Coumadin dose x2 day 02-20-19 and 02/03/2019.  Resume his usual dose on Monday and reports his Coumadin Clinic.  Patient sees cardiology at [facility] he has follow-up appointment with primary care Dr. Edwards arranged by case management.  He is super anxious for discharge, his vitals were grossly normal blood pressure 124/62 pulse 69 respirations 16 sat 96% on room air.  Discharged in stable condition     Consults:   Consults (From admission, onward)        Status Ordering Provider     Inpatient consult to Cardiology  Once     Provider:  Phil Roth MD    Acknowledged JEROME WU     Inpatient consult to Registered Dietitian/Nutritionist  Once     Provider:  (Not yet assigned)    Completed JEROME WU     IP consult to case management  Once     Provider:  (Not yet assigned)    Acknowledged JEROME WU          No new Assessment & Plan notes have been filed under this hospital service since the last note was generated.  Service: Hospital Medicine    Final Active Diagnoses:    Diagnosis Date Noted POA    PRINCIPAL PROBLEM:  Acute on chronic congestive heart failure [I50.9] 02/01/2019 Yes    Atrial fibrillation [I48.91] 09/18/2015 Yes    Coronary artery disease, PCI to LAD in 1994 and CABG in 1997, PCI LCx 1997 [I25.10] 09/18/2015 Yes    S/P abdominal aortic aneurysm repair 1997 [Z98.890, Z86.79] 09/18/2015 Not Applicable    Carotid artery disease [I77.9] 09/18/2015 Yes      Problems Resolved During this Admission:       Discharged Condition: stable    Disposition: Home or Self Care    Follow Up:  Follow-up Information      Haja Edwards MD On 2/5/2019.    Specialty:  Family Medicine  Why:  @1:00pm, for Hospital Follow up  Contact information:  [place] [id number] [phone]                 Patient Instructions:      Diet Cardiac     Activity as tolerated       Significant Diagnostic Studies: Labs:   CMP   Recent Labs   Lab 02/01/19  0143      K 5.6*      CO2 21*   *   BUN 21   CREATININE 1.4   CALCIUM 8.9   PROT 6.3   ALBUMIN 3.3*   BILITOT 1.3*   ALKPHOS 99   AST 47*   ALT 27   ANIONGAP 9   ESTGFRAFRICA 58*   EGFRNONAA 50*   , CBC   Recent Labs   Lab 02/01/19  0143   WBC 8.23   HGB 11.9*   HCT 36.7*      , INR   Lab Results   Component Value Date    INR 5.2 (HH) 02/01/2019    INR 1.2 06/25/2018    INR 1.6 (H) 06/22/2018   , Lipid Panel   Lab Results   Component Value Date    CHOL 141 02/01/2019    HDL 38 (L) 02/01/2019    LDLCALC 83.4 02/01/2019    TRIG 98 02/01/2019    CHOLHDL 27.0 02/01/2019   , Troponin   Recent Labs   Lab 02/01/19  1421   TROPONINI 0.122*    and A1C: No results for input(s): HGBA1C in the last 4320 hours.    Pending Diagnostic Studies:     None         Medications:  Reconciled Home Medications:      Medication List      CHANGE how you take these medications    warfarin 4 MG tablet  Commonly known as:  COUMADIN  Take 1 tablet (4 mg total) by mouth Daily.  What changed:  when to take this        CONTINUE taking these medications    ALPRAZolam 0.25 MG tablet  Commonly known as:  XANAX  Take 0.25 mg by mouth as needed for Anxiety.     amiodarone 200 MG Tab  Commonly known as:  PACERONE  Take 200 mg by mouth 2 (two) times daily.     atorvastatin 40 MG tablet  Commonly known as:  LIPITOR  Take 40 mg by mouth once daily.     doxycycline 100 MG tablet  Commonly known as:  VIBRA-TABS  Take 1 tablet (100 mg total) by mouth 2 (two) times daily.     ferrous sulfate 325 (65 FE) MG EC tablet  Take 1 tablet (325 mg total) by mouth 2 (two) times daily.     folic acid 1 MG  tablet  Commonly known as:  FOLVITE  Take 1 tablet (1 mg total) by mouth once daily.     hydrALAZINE 50 MG tablet  Commonly known as:  APRESOLINE  Take 50 mg by mouth 2 (two) times daily.     HYDROcodone-acetaminophen 5-325 mg per tablet  Commonly known as:  NORCO  Take 1 tablet by mouth every 6 (six) hours as needed for Pain.     LASIX 40 MG tablet  Generic drug:  furosemide  Take 40 mg by mouth 2 (two) times daily.     spironolactone 25 MG tablet  Commonly known as:  ALDACTONE  Take 25 mg by mouth 3 (three) times a week.     TRILIPIX 135 mg Cpdr  Generic drug:  fenofibric acid  Take by mouth once daily.            Indwelling Lines/Drains at time of discharge:   Lines/Drains/Airways          None          Time spent on the discharge of patient: 30 minutes  Patient was seen and examined on the date of discharge and determined to be suitable for discharge.       VERN Chavez, FNP-C  Hospitalist - Department of Hospital Medicine  [address], [place] [address]  Office [phone]; Pager [phone]

## 2019-02-01 NOTE — ED TRIAGE NOTES
Chest Pain: Patient complains of chest pain. Onset was several  years ago, with worsening course since that time. The patient describes the pain as intermittent, dull and substernal in nature, does not radiate. Patient rates pain as a 8/10 in intensity.  Associated symptoms are chest pain and chest pressure/discomfort.

## 2019-02-01 NOTE — PROGRESS NOTES
Food & Nutrition  Education    Diet Education:CHF  Time Spent: 10 min  Learners: Patient      Nutrition Education provided with handouts:   1. CHF and nutrition      Comments: Met with patient and discussed low sodium diet and CHF. Pt receptive. States he has been limiting salt since a heart surgery in 1997. Pt does state he eats out a bit and that he and his wife do limited food prep at home. Discussed label reading and limiting sodium in dining out. Pt receptive. Discussed relationship between sodium and fluid retention. Provided handouts and f/u resources.       All questions and concerns answered. Dietitian's contact information provided.         Please Re-consult as needed  Thanks!    Lillian Tellez RD

## 2019-02-01 NOTE — SUBJECTIVE & OBJECTIVE
Past Medical History:   Diagnosis Date    Anticoagulant long-term use     Anxiety     Arthritis     Atrial fibrillation     Atrial flutter     Cardiomyopathy     Carotid artery occlusion     CHF (congestive heart failure)     Coronary artery disease     Debility     Hypertension     Insomnia     Panic attacks     SOB (shortness of breath) on exertion     Stroke        Past Surgical History:   Procedure Laterality Date    CARDIAC SURGERY      EXTRACTION-LEAD N/A 10/28/2015    [facility]    EYE SURGERY      TONSILLECTOMY         Review of patient's allergies indicates:  No Known Allergies    No current facility-administered medications on file prior to encounter.      Current Outpatient Medications on File Prior to Encounter   Medication Sig    hydrocodone-acetaminophen 5-325mg (NORCO) 5-325 mg per tablet Take 1 tablet by mouth every 6 (six) hours as needed for Pain.    alprazolam (XANAX) 0.25 MG tablet Take 0.25 mg by mouth as needed for Anxiety.    amiodarone (PACERONE) 200 MG Tab Take 200 mg by mouth 2 (two) times daily.    atorvastatin (LIPITOR) 40 MG tablet Take 40 mg by mouth once daily.    doxycycline (VIBRA-TABS) 100 MG tablet Take 1 tablet (100 mg total) by mouth 2 (two) times daily.    fenofibric acid (TRILIPIX) 135 mg CpDR Take by mouth once daily.    ferrous sulfate 325 (65 FE) MG EC tablet Take 1 tablet (325 mg total) by mouth 2 (two) times daily.    folic acid (FOLVITE) 1 MG tablet Take 1 tablet (1 mg total) by mouth once daily.    furosemide (LASIX) 40 MG tablet Take 40 mg by mouth 2 (two) times daily.    hydrALAZINE (APRESOLINE) 50 MG tablet Take 50 mg by mouth 2 (two) times daily.     spironolactone (ALDACTONE) 25 MG tablet Take 25 mg by mouth 3 (three) times a week.     warfarin (COUMADIN) 4 MG tablet Take 4 mg by mouth once daily.     Family History     None        Tobacco Use    Smoking status: Former Smoker     Packs/day: 0.25      Years: 33.00     Pack years: 8.25     Last attempt to quit: 1997     Years since quittin.3    Smokeless tobacco: Never Used   Substance and Sexual Activity    Alcohol use: No     Alcohol/week: 0.0 oz    Drug use: No    Sexual activity: Not on file     Review of Systems   Constitution: Negative for decreased appetite.   HENT: Negative for ear discharge.    Eyes: Negative for blurred vision.   Endocrine: Negative for polyphagia.   Skin: Negative for nail changes.   Neurological: Negative for aphonia.   Psychiatric/Behavioral: Negative for hallucinations.     Objective:     Vital Signs (Most Recent):  Temp: 97.7 °F (36.5 °C) (19)  Pulse: 68 (19)  Resp: 18 (19)  BP: (!) 152/81 (19)  SpO2: 98 % (19) Vital Signs (24h Range):  Temp:  [97.7 °F (36.5 °C)-98 °F (36.7 °C)] 97.7 °F (36.5 °C)  Pulse:  [68-70] 68  Resp:  [14-19] 18  SpO2:  [93 %-100 %] 98 %  BP: (126-152)/(72-88) 152/81     Weight: 63.7 kg (140 lb 8 oz)  Body mass index is 20.16 kg/m².    SpO2: 98 %  O2 Device (Oxygen Therapy): room air      Intake/Output Summary (Last 24 hours) at 2019 1106  Last data filed at 2019 0614  Gross per 24 hour   Intake --   Output 2650 ml   Net -2650 ml       Lines/Drains/Airways     Drain                 Urethral Catheter 10/28/15 0836 1192 days          Peripheral Intravenous Line                 Peripheral IV - Single Lumen 19 0012 Left Hand less than 1 day                Physical Exam   Constitutional: He is oriented to person, place, and time. He appears well-developed and well-nourished.   HENT:   Head: Normocephalic and atraumatic.   Eyes: Conjunctivae are normal. Pupils are equal, round, and reactive to light.   Neck: Normal range of motion. Neck supple.   Cardiovascular: Normal rate, normal heart sounds and intact distal pulses.   Pulmonary/Chest: Effort normal and breath sounds normal.   Abdominal: Soft. Bowel sounds are normal.    Musculoskeletal: Normal range of motion.   Neurological: He is alert and oriented to person, place, and time.   Skin: Skin is warm and dry.       Significant Labs: All pertinent lab results from the last 24 hours have been reviewed.    Significant Imaging: Echocardiogram:   2D echo with color flow doppler:   Results for orders placed or performed in visit on 09/21/15   2D Echo w/ Color Flow Doppler   Result Value Ref Range    QEF 40 (A) 55 - 65    Diastolic Dysfunction Yes (A)     Est. PA Systolic Pressure 37     Tricuspid Valve Regurgitation MILD

## 2019-02-01 NOTE — HPI
71-year-old male with history of CHF, CAD, AFib, hypertension present with chest pain shortness of breath just prior to arrival.  Chest pain is in left chest, nonradiating that has now resolved at ED.   Patient has been having worsening of shortness of breath for the past 1 week.  Shortness of breath worsening with walking for 3-5 steps. Patient was seen by his cardiologist, Dr. Nicole last week, and had his medication changed (unknown) without improvement. Patient has been taking all his medications without abruption. Patient denies fever chills nausea vomiting.      [place] [facility]  Troponin 0.1 flat pattern  Currently CP free  EKG V-paced    Last saw EP 2015  paroxysmal AF, chronic systolic ischemic CMP (EF recently 40%) s/p CABG and PCI to LAD/LCx in 1990s, HTN, HLD, and ICD implant in 2006 with inappropriate shocks for AF with RVR presents for lead extraction evaluation.     ICD was at [facility] last year, had generator change to PPM 8/2014.   Patient did not want an ICD replaced as he had PTSD from inappropriate shocks.  PPM placed instead.   Then in May of 2015 developed a superficial blister over pocket.  Underwent removal of generator, leads capped, cultures reportedly negative and he was treated with clindamycin for 14 days.   Originally improved.  However for 3-4 weeks it began to blister again. He was following with ID at [facility], Dr. Sales, and was seeing Dr. Jarrett. He completed 14 days of clindamycin which did not help. He has since been treating it topically with mupirocin ointment.   After placed on doxycycline which he just finished.  No fever or chills, cultures negative.   He does have pain at the site.     ECHO showed LVEF 40%  CXR showed single-coil ICD lead in the RVOT

## 2023-01-01 NOTE — HPI
Martin Gutierrez is a 73 y.o. male patient with a history of long-term anticoagulation use, atrial fib, cardiomyopathy, heart failure, CAD, hypertension, stroke, and panic attacks.  Patient presented for evaluation of acute onset left-sided chest pain with associated shortness of breath, chest pain was nonradiating, however he reports activity intolerance orthopnea where as he could not lay down flat and he could not walk any more than about 4 or 5 steps without getting extremely short of breath.  Patient reports that the [facility] vishnu is his cardiologist and he saw him last week.  Patient reports a recent change in his medication regimen with no improvement.    Initial workup reveals supratherapeutic INR at 5.2, hyperkalemia at 5.6, elevated troponin 1 level peaked at 0.133, and BNP of 2,847. Chest x-ray x-ray revealed cardiomegaly with mild interstitial edema, patient's last 2D echo was in 2015 he had an LVEF of 40% with diastolic dysfunction   21.346

## 2023-01-20 NOTE — DISCHARGE INSTRUCTIONS
PLEASE BRING TO ALL FOLLOW UP APPOINTMENTS:   A COPY YOUR DISCHARGE INSTRUCTIONS, Any new MEDICINES YOU ARE CURRENTLY TAKING IN THEIR ORIGINAL BOTTLES  And IDENTIFICATION AND INSURANCE CARD     **PLEASE ARRIVE 15 MINUTES AHEAD OF SCHEDULED APPOINTMENT TIME   ++PLEASE CALL 24 HOURS IN ADVANCE IF YOU MUST RESCHEDULE YOUR APPOINTMENT DAY AND/OR TIME       Patient noted to have supratherapeutic INR equals 5.2.  He is instructed to hold his Coumadin dose x2 day 02-20-19 and 02/03/2019.  Resume his usual dose on Monday and reports his Coumadin Clinic.        Stage 2: Additional Anesthesia Type: 1% lidocaine with epinephrine